# Patient Record
Sex: MALE | Race: WHITE | NOT HISPANIC OR LATINO | Employment: FULL TIME | URBAN - METROPOLITAN AREA
[De-identification: names, ages, dates, MRNs, and addresses within clinical notes are randomized per-mention and may not be internally consistent; named-entity substitution may affect disease eponyms.]

---

## 2023-06-06 ENCOUNTER — HOSPITAL ENCOUNTER (OUTPATIENT)
Facility: HOSPITAL | Age: 43
Discharge: HOME | End: 2023-06-06
Attending: STUDENT IN AN ORGANIZED HEALTH CARE EDUCATION/TRAINING PROGRAM | Admitting: STUDENT IN AN ORGANIZED HEALTH CARE EDUCATION/TRAINING PROGRAM
Payer: MEDICAID

## 2023-06-06 ENCOUNTER — APPOINTMENT (OUTPATIENT)
Dept: GENERAL RADIOLOGY | Facility: HOSPITAL | Age: 43
End: 2023-06-06
Payer: MEDICAID

## 2023-06-06 VITALS
HEIGHT: 67 IN | DIASTOLIC BLOOD PRESSURE: 112 MMHG | OXYGEN SATURATION: 98 % | WEIGHT: 180 LBS | BODY MASS INDEX: 28.25 KG/M2 | RESPIRATION RATE: 18 BRPM | HEART RATE: 91 BPM | TEMPERATURE: 98.4 F | SYSTOLIC BLOOD PRESSURE: 170 MMHG

## 2023-06-06 DIAGNOSIS — S62.336A CLOSED DISPLACED FRACTURE OF NECK OF FIFTH METACARPAL BONE OF RIGHT HAND, INITIAL ENCOUNTER: Primary | ICD-10-CM

## 2023-06-06 PROCEDURE — G0463 HOSPITAL OUTPT CLINIC VISIT: HCPCS | Performed by: NURSE PRACTITIONER

## 2023-06-06 PROCEDURE — 73130 X-RAY EXAM OF HAND: CPT

## 2023-06-06 NOTE — [facility] PROVIDER NOTE
EMERGENCY DEPARTMENT ENCOUNTER    Room Number:  09/09  Date seen:  6/6/2023  Time seen: 16:01 EDT  PCP: Hellen Granados APRN  Historian:     HPI:  Chief complaint: Right hand pain  Context:Saurabh Charles is a 43 y.o. male who presents to the ED with c/o pain in right hand.  Patient reports he works out with a punching bag.  Reports that he was punching it without wearing his wraps or gloves and states that he had a just right as a bag was coming back to him causing him to have an area of swelling along the fifth metacarpal of the right hand.  Patient reports pain is minimal, denies any numbness or tingling.  Patient reports that he is done this frequently in the past and is aware of what to look for.    Timing: Today  Duration: Constant  Location: Right hand fifth metatarsal  Radiation: Nonradiating  Quality: Aching  Intensity/Severity: 2 out of 10  Associated Symptoms: None  Aggravating Factors: Movement with right hand  Alleviating Factors: Rest  Previous Episodes: Patient reports previous boxer fracture injuries in the past  Treatment before arrival: None    The patient was placed in a mask in triage, hand hygiene was performed before and after my interaction with the patient.  I wore a mask, safety glasses and gloves during my entire interaction with the patient.    MEDICAL RECORD REVIEW  Yes    ALLERGIES  Patient has no known allergies.    PAST MEDICAL HISTORY  Active Ambulatory Problems     Diagnosis Date Noted    No Active Ambulatory Problems     Resolved Ambulatory Problems     Diagnosis Date Noted    No Resolved Ambulatory Problems     No Additional Past Medical History       PAST SURGICAL HISTORY  History reviewed. No pertinent surgical history.    FAMILY HISTORY  History reviewed. No pertinent family history.    SOCIAL HISTORY  Social History     Socioeconomic History    Marital status: Single       REVIEW OF SYSTEMS  Review of Systems   Constitutional: Negative.    HENT: Negative.     Respiratory:  Negative.     Cardiovascular: Negative.    Gastrointestinal: Negative.    Musculoskeletal:         Patient reports mild pain in right hand along the fifth metacarpal     All systems reviewed and negative except for those discussed in HPI.     PHYSICAL EXAM    I have reviewed the triage vital signs and nursing notes.    ED Triage Vitals   Temp Heart Rate Resp BP SpO2   06/06/23 1541 06/06/23 1541 06/06/23 1541 06/06/23 1546 06/06/23 1541   98.4 °F (36.9 °C) 91 18 (!) 170/112 98 %      Temp src Heart Rate Source Patient Position BP Location FiO2 (%)   06/06/23 1541 06/06/23 1541 06/06/23 1541 06/06/23 1541 --   Temporal Monitor Sitting Left arm        Physical Exam  Constitutional:       General: He is not in acute distress.     Appearance: Normal appearance. He is not ill-appearing, toxic-appearing or diaphoretic.   HENT:      Head: Normocephalic and atraumatic.   Cardiovascular:      Rate and Rhythm: Normal rate and regular rhythm.      Pulses: Normal pulses.   Pulmonary:      Effort: Pulmonary effort is normal.   Musculoskeletal:         General: Swelling, tenderness (Mild tenderness to palpation) and deformity (Slight deformity at the distal end of the right fifth metatarsal) present. Normal range of motion.      Cervical back: Normal range of motion and neck supple.      Comments: Brisk cap refill, sensation intact, peripheral pulses present +3, patient has full range of motion in hand and fingers.   Skin:     General: Skin is warm and dry.   Neurological:      Mental Status: He is alert.       Vital signs and nursing notes reviewed.        LAB RESULTS  No results found for this or any previous visit (from the past 24 hour(s)).    Ordered the above labs and independently reviewed the results.      RADIOLOGY RESULTS  XR Hand 3+ View Right    Result Date: 6/6/2023  XR HAND 3+ VW RIGHT Date of Exam: 6/6/2023 3:54 PM EDT Indication: hand injury punching injury. Pain. Comparison: None available. Findings: There is a  angulated mildly impacted fracture of the distal fifth metacarpal. There is apex dorsal angulation measuring about 50 degrees. There is impaction of about 4 mm. There is soft tissue swelling. There is no intra-articular extent of the fracture line. No other fractures are seen. There is mild triscaphe the joint space narrowing. There are no osseous erosions.     Impression: There is an apex dorsolateral angulated mildly impacted fracture of the right distal fifth metacarpal neck. Electronically Signed: Iesha Osorio  6/6/2023 4:10 PM EDT  Workstation ID: [id number]        I ordered the above noted radiological studies. Independently reviewed by me and discussed with radiologist.  See dictation above for official radiology interpretation.      Orders placed during this visit:  Orders Placed This Encounter   Procedures    XR Hand 3+ View Right    Ambulatory Referral to Orthopedic Surgery (All except Pad)              Medical Decision Making  We will obtain x-ray of right hand, suspect patient has a boxers break of the fifth metacarpal    We will place patient in an ulnar gutter splint after x-ray report    Discussed with patient his blood pressure being elevated, today it is 170/112, patient reports that he is in the middle of a custody mendez and just got the phone with his  before he came in so we suspect that that is the cause of his blood pressure.  I did recommend to the patient that he keep an eye on it and monitor it and follow-up with his primary care provider next couple weeks regarding his blood pressure.  Patient denies any chest pain, difficulty breathing, nausea or vomiting, lightheadedness or dizziness.    X-ray is interpreted by radiology  Impression:  There is an apex dorsolateral angulated mildly impacted fracture of the right distal fifth metacarpal neck.         Discussed results with patient regarding x-ray and plan of care, patient is agreeable plan of care, all questions answered at this  time    Amount and/or Complexity of Data Reviewed  Radiology: ordered.        PROCEDURES    Procedures        MEDICATIONS GIVEN IN ER    Medications - No data to display      PROGRESS, DATA ANALYSIS, CONSULTS, AND MEDICAL DECISION MAKING    All labs have been independently reviewed by me.  All radiology studies have been reviewed by me.   EKG's independently reviewed by me.  Discussion below represents my analysis of pertinent findings related to patient's condition, differential diagnosis, treatment plan and final disposition.    DIFFERENTIAL DIAGNOSIS INCLUDE BUT NOT LIMITED TO: Right fifth metatarsal fracture, fracture of the proximal fifth phalanx, contusion         AS OF 16:18 EDT VITALS:    BP - (!) 170/112  HR - 91  TEMP - 98.4 °F (36.9 °C) (Temporal)  02 SATS - 98%    I rechecked the patient.  I discussed the patient's labs, radiology findings (including all incidental findings), diagnosis, and plan for discharge.  A repeat exam reveals no new worrisome changes from my initial exam findings.  The patient understands that the fact that they are being discharged does not denote that nothing is abnormal, it indicates that no clinical emergency is present and that they must follow-up as directed in order to properly maintain their health.  Follow-up instructions (specifically listed below) and return to ER precautions were given at this time.  I specifically instructed the patient to follow-up with their PCP.  The patient understands and agrees with the plan, and is ready for discharge.  All questions answered.    Critical care:  Total critical care time of 0 minutes is exclusive of any other billable procedures and includes time spent with direct patient care and observation, retrospective chart review, management of acute condition, and consultation with other medical providers.    DIAGNOSIS  Final diagnoses:   Closed displaced fracture of neck of fifth metacarpal bone of right hand, initial encounter          DISPOSITION  Discharge home    Pt masked in first look. I wore a surgical mask throughout my encounters with the pt. I performed hand hygiene on entry into the pt room and upon exit.     Dictated utilizing Dragon dictation     Note Disclaimer: At [facility], we believe that sharing information builds trust and better relationships. You are receiving this note because you recently visited [facility]. It is possible you will see health information before a provider has talked with you about it. This kind of information can be easy to misunderstand. To help you fully understand what it means for your health, we urge you to discuss this note with your provider.

## 2023-06-06 NOTE — DISCHARGE INSTRUCTIONS
Keep the splint on until you follow-up with orthopedics.  Recommend Tylenol ibuprofen as needed for pain.  Keep it elevated if it begins to throb.  Call orthopedics first thing tomorrow for an appointment for reevaluation in the next 3 to 5 days.  Return to the emergency department for worsening symptoms including numbness or tingling, weakness or other concerns.

## 2023-06-06 NOTE — ED TRIAGE NOTES
"Pt reports right hand injury while working out Friday. Pt was punching a heavy bag and did not \"wrap\" his hand. Swelling noted.   "

## 2023-06-09 ENCOUNTER — OFFICE VISIT (OUTPATIENT)
Dept: SPORTS MEDICINE | Facility: CLINIC | Age: 43
End: 2023-06-09
Payer: MEDICAID

## 2023-06-09 VITALS — HEART RATE: 90 BPM | WEIGHT: 180 LBS | OXYGEN SATURATION: 97 % | BODY MASS INDEX: 28.25 KG/M2 | HEIGHT: 67 IN

## 2023-06-09 DIAGNOSIS — S62.336A CLOSED DISPLACED FRACTURE OF NECK OF FIFTH METACARPAL BONE OF RIGHT HAND, INITIAL ENCOUNTER: Primary | ICD-10-CM

## 2023-06-09 PROBLEM — E78.5 HYPERLIPIDEMIA: Status: ACTIVE | Noted: 2023-06-09

## 2023-06-09 PROBLEM — K21.9 GASTROESOPHAGEAL REFLUX DISEASE: Status: ACTIVE | Noted: 2023-06-09

## 2023-06-09 PROBLEM — F32.A DEPRESSION: Status: ACTIVE | Noted: 2023-06-09

## 2023-06-09 PROBLEM — F41.9 ANXIETY DISORDER: Status: ACTIVE | Noted: 2023-06-09

## 2023-06-09 RX ORDER — LISDEXAMFETAMINE DIMESYLATE 70 MG/1
70 CAPSULE ORAL EVERY MORNING
COMMUNITY
Start: 2023-05-18

## 2023-06-09 RX ORDER — TESTOSTERONE CYPIONATE 200 MG/ML
200 INJECTION, SOLUTION INTRAMUSCULAR WEEKLY
COMMUNITY
Start: 2023-06-01

## 2023-06-09 RX ORDER — BUPROPION HYDROCHLORIDE 300 MG/1
300 TABLET ORAL EVERY MORNING
COMMUNITY
Start: 2023-05-28

## 2023-06-09 RX ORDER — DULOXETIN HYDROCHLORIDE 60 MG/1
1 CAPSULE, DELAYED RELEASE ORAL DAILY
COMMUNITY
Start: 2023-05-26

## 2023-06-09 RX ORDER — QUETIAPINE FUMARATE 50 MG/1
TABLET, FILM COATED ORAL
COMMUNITY
Start: 2023-05-29

## 2023-06-09 NOTE — PROGRESS NOTES
NEW VISIT    Patient: Saurabh Charles  ?  YOB: 1980    MRN: [id number]  ?  Chief Complaint   Patient presents with    Right Hand - Initial Evaluation      ?  HPI: Patient is a 43-year-old male presents today for right hand pain.  He is right-hand dominant.  On 6/2/2023 patient was doing a boxing workout where he was punching a heavy bag and after a series of punches where his hands were not taped or gloved he felt lateral right hand pain and swelling. Reports he has felt similar pain before after a similar exercise routine, but that it resolved on its own, however this pain continued he was subsequently evaluated at the ER with x-rays obtained as noted below remarkable for a boxer's fracture.  Was quickly placed in a splint and referred to our office for further management.  Has been using over-the-counter NSAIDs and ice for pain control.  He is very active in both workouts, golf, boxing as well as other sporting activity.    Allergies: No Known Allergies    Past Medical History:   Diagnosis Date    Anxiety disorder     Depression     GERD (gastroesophageal reflux disease)     Headache     Hyperlipidemia     Hypertension      Past Surgical History:   Procedure Laterality Date    VASECTOMY      2014     Social History     Occupational History    Not on file   Tobacco Use    Smoking status: Never    Smokeless tobacco: Never   Substance and Sexual Activity    Alcohol use: Yes     Comment: rarely    Drug use: Yes     Types: Marijuana     Comment: occasionally    Sexual activity: Defer      Social History     Social History Narrative    Not on file     Family History   Problem Relation Age of Onset    Hyperlipidemia Maternal Grandmother     Hyperlipidemia Maternal Grandfather     Stroke Maternal Grandfather     Hyperlipidemia Paternal Grandmother     Heart disease Paternal Grandfather     Cancer Paternal Grandfather     Hyperlipidemia Paternal Grandfather        Review of Systems  Constitutional: Negative.  " Negative for fever.   Musculoskeletal: Positive for joint pain  Skin: Negative.  Negative for rash and wound.    Neurological: Negative for numbness.     Vitals:    06/09/23 0814   Pulse: 90   SpO2: 97%   Weight: 81.6 kg (180 lb)   Height: 170.2 cm (67\")        Physical Exam  Constitutional: Patient is oriented to person, place, and time. Appears well-developed and well-nourished.   Head: Normocephalic and atraumatic.   Pulmonary/Chest: Effort normal.   Musculoskeletal:   See detailed exam below   Neurological: Alert and oriented to person, place, and time. No sensory deficit. Coordination normal.   Skin: Skin is warm and dry. Capillary refill takes less than 2 seconds. No rash noted. No erythema.     Right hand exam.  There is mild dorsal prominence of the fifth MCP joint.  There is tenderness with dorsal swelling over the dorsal aspect of the right fifth MCP joint.  There is no tenderness to palpation or swelling over dorsal and volar aspects of the DIP or PIP joints. Skin and soft tissue intact, no bruising. Flexor and extensor tendons are intact with no extensor lag or flexion deformity.  No laxity to collateral ligaments. Capillary refill is 2 seconds with a brisk return.  Slight rotational movement radially with full flexion of the fifth digit toward the radial styloid. Neurovascularly intact.      Diagnostics:  xrays obtained last on 6/6/2023     XR Hand 3+ View Right    Result Date: 6/6/2023  Impression: There is an apex dorsolateral angulated mildly impacted fracture of the right distal fifth metacarpal neck. Electronically Signed: Iesha Osorio  6/6/2023 4:10 PM EDT  Workstation ID: [id number]     Assessment:  Diagnoses and all orders for this visit:    1. Closed displaced fracture of neck of fifth metacarpal bone of right hand, initial encounter (Primary)  -     Ambulatory Referral to Hand Surgery      Plan    Activity modifications discussed and recommended  Casting and/or bracing options discussed.  Patient " fitted for right boxer's fracture Exos in office today.    Rest, ice, compression, and elevation (RICE) therapy  Stretching and strengthening exercises  Alternate OTC Ibuprofen and Tylenol as needed   Follow up with hand surgery as noted above    Diagnosis and plan discussed with the patient office today.  Personally review his x-ray images and radiology report with the patient in office today from his ER visit remarkable for a right fifth metacarpal neck boxer's fracture.  There is noted approximately 50 degrees of dorsal angulation, and 4 mm of impaction.  On exam there is also concern for early rotational deformity with the right fifth digit moving mildly radially with full flexion towards radial styloid.  Given significant angulation, patient's age and activity level, dominant hand, as well as concern for mild rotational deformity.  I did discuss with the patient and recommended referral to a hand surgical specialist to review potential surgical options for his fracture in order to obtain more anatomic alignment and prevent loss of  strength and range of motion.  Patient was agreeable and referral was placed as noted above, with an appointment of 6/13/2023 at [facility].  Regarding immobilization the patient was placed in a right boxer's fracture Exos splint in the office today.  I recommend he remains in the splint full-time, including sleep, except for periods for hygiene until follow-up with a hand specialist.  Patient is agreeable.    Date of encounter: 06/09/2023   Mu Ardon DO    Electronically signed by Mu Ardon DO, 06/09/23, 8:07 AM EDT.    Disclaimer: Please note that areas of this note were completed with computer voice recognition software.  Quite often unanticipated grammatical, syntax, homophones, and other interpretive errors are inadvertently transcribed by the computer software. Please excuse any errors that have escaped final proofreading.